# Patient Record
Sex: MALE | Race: WHITE
[De-identification: names, ages, dates, MRNs, and addresses within clinical notes are randomized per-mention and may not be internally consistent; named-entity substitution may affect disease eponyms.]

---

## 2018-01-03 ENCOUNTER — HOSPITAL ENCOUNTER (OUTPATIENT)
Dept: HOSPITAL 75 - CARD | Age: 23
End: 2018-01-03
Attending: INTERNAL MEDICINE
Payer: COMMERCIAL

## 2018-01-03 DIAGNOSIS — R07.9: Primary | ICD-10-CM

## 2018-01-03 PROCEDURE — 93306 TTE W/DOPPLER COMPLETE: CPT

## 2018-01-08 ENCOUNTER — HOSPITAL ENCOUNTER (OUTPATIENT)
Dept: HOSPITAL 75 - RAD | Age: 23
End: 2018-01-08
Attending: INTERNAL MEDICINE
Payer: COMMERCIAL

## 2018-01-08 DIAGNOSIS — R07.9: Primary | ICD-10-CM

## 2018-01-08 PROCEDURE — 71275 CT ANGIOGRAPHY CHEST: CPT

## 2018-01-08 NOTE — DIAGNOSTIC IMAGING REPORT
PROCEDURE: CT angiography of the chest with contrast.



TECHNIQUE: Multiple contiguous axial images were obtained through

the chest after uneventful bolus administration of intravenous

contrast. Reconstructed CTA MIP acquisitions were also performed.

 

INDICATION: Chest pain for three to four weeks.



FINDINGS:  There are no primary nodules, masses, or infiltrates.

There is no pleural or pericardial fluid. There is no

pneumothorax. There is no pathologically enlarged adenopathy in

the chest. The thoracic aorta is normal in caliber and without

evidence of dissection. There are no filling defects seen within

the pulmonary arteries to suggest a pulmonary embolism. The

visualized intraabdominal structures are unremarkable. The

osseous structures are unremarkable.



IMPRESSION: No acute abnormality in the chest. Specifically,

there is no evidence of a pulmonary embolism or aortic

dissection.



Dictated by: 



  Dictated on workstation # [id number]

## 2018-03-09 ENCOUNTER — HOSPITAL ENCOUNTER (EMERGENCY)
Dept: HOSPITAL 75 - ER | Age: 23
Discharge: HOME | End: 2018-03-09
Payer: COMMERCIAL

## 2018-03-09 VITALS — BODY MASS INDEX: 28.88 KG/M2 | WEIGHT: 225 LBS | HEIGHT: 74 IN

## 2018-03-09 VITALS — DIASTOLIC BLOOD PRESSURE: 74 MMHG | SYSTOLIC BLOOD PRESSURE: 114 MMHG

## 2018-03-09 DIAGNOSIS — R07.89: Primary | ICD-10-CM

## 2018-03-09 LAB
ALBUMIN SERPL-MCNC: 4.5 GM/DL (ref 3.2–4.5)
ALP SERPL-CCNC: 116 U/L (ref 40–136)
ALT SERPL-CCNC: 25 U/L (ref 0–55)
BASOPHILS # BLD AUTO: 0 10^3/UL (ref 0–0.1)
BASOPHILS NFR BLD AUTO: 0 % (ref 0–10)
BILIRUB SERPL-MCNC: 0.8 MG/DL (ref 0.1–1)
BUN/CREAT SERPL: 17
CALCIUM SERPL-MCNC: 9.2 MG/DL (ref 8.5–10.1)
CHLORIDE SERPL-SCNC: 104 MMOL/L (ref 98–107)
CO2 SERPL-SCNC: 24 MMOL/L (ref 21–32)
CREAT SERPL-MCNC: 1.09 MG/DL (ref 0.6–1.3)
EOSINOPHIL # BLD AUTO: 0 10^3/UL (ref 0–0.3)
EOSINOPHIL NFR BLD AUTO: 0 % (ref 0–10)
ERYTHROCYTE [DISTWIDTH] IN BLOOD BY AUTOMATED COUNT: 12.6 % (ref 10–14.5)
GFR SERPLBLD BASED ON 1.73 SQ M-ARVRAT: > 60 ML/MIN
GLUCOSE SERPL-MCNC: 96 MG/DL (ref 70–105)
HCT VFR BLD CALC: 43 % (ref 40–54)
HGB BLD-MCNC: 15.6 G/DL (ref 13.3–17.7)
LYMPHOCYTES # BLD AUTO: 2.2 X 10^3 (ref 1–4)
LYMPHOCYTES NFR BLD AUTO: 22 % (ref 12–44)
MAGNESIUM SERPL-MCNC: 2.1 MG/DL (ref 1.8–2.4)
MANUAL DIFFERENTIAL PERFORMED BLD QL: NO
MCH RBC QN AUTO: 31 PG (ref 25–34)
MCHC RBC AUTO-ENTMCNC: 37 G/DL (ref 32–36)
MCV RBC AUTO: 84 FL (ref 80–99)
MONOCYTES # BLD AUTO: 0.7 X 10^3 (ref 0–1)
MONOCYTES NFR BLD AUTO: 7 % (ref 0–12)
NEUTROPHILS # BLD AUTO: 7 X 10^3 (ref 1.8–7.8)
NEUTROPHILS NFR BLD AUTO: 71 % (ref 42–75)
PLATELET # BLD: 228 10^3/UL (ref 130–400)
PMV BLD AUTO: 9.6 FL (ref 7.4–10.4)
POTASSIUM SERPL-SCNC: 3.8 MMOL/L (ref 3.6–5)
PROT SERPL-MCNC: 7.1 GM/DL (ref 6.4–8.2)
RBC # BLD AUTO: 5.08 10^6/UL (ref 4.35–5.85)
SODIUM SERPL-SCNC: 138 MMOL/L (ref 135–145)
TSH SERPL DL<=0.05 MIU/L-ACNC: 2.16 UIU/ML (ref 0.35–4.94)
WBC # BLD AUTO: 10 10^3/UL (ref 4.3–11)

## 2018-03-09 PROCEDURE — 93005 ELECTROCARDIOGRAM TRACING: CPT

## 2018-03-09 PROCEDURE — 85025 COMPLETE CBC W/AUTO DIFF WBC: CPT

## 2018-03-09 PROCEDURE — 36415 COLL VENOUS BLD VENIPUNCTURE: CPT

## 2018-03-09 PROCEDURE — 84484 ASSAY OF TROPONIN QUANT: CPT

## 2018-03-09 PROCEDURE — 84443 ASSAY THYROID STIM HORMONE: CPT

## 2018-03-09 PROCEDURE — 80053 COMPREHEN METABOLIC PANEL: CPT

## 2018-03-09 PROCEDURE — 71045 X-RAY EXAM CHEST 1 VIEW: CPT

## 2018-03-09 PROCEDURE — 86141 C-REACTIVE PROTEIN HS: CPT

## 2018-03-09 PROCEDURE — 83735 ASSAY OF MAGNESIUM: CPT

## 2018-03-09 PROCEDURE — 93041 RHYTHM ECG TRACING: CPT

## 2018-03-09 NOTE — DIAGNOSTIC IMAGING REPORT
INDICATION: Chest pain starting an hour ago at baseball practice.



COMPARISON STUDY: CTA of the chest from January 8th.



FINDINGS: Frontal view of the chest demonstrates the lungs to be

clear. The heart, mediastinum, pulmonary vascularity and

visualized bony thorax are normal.



IMPRESSION: Negative chest. 



Dictated by: 



  Dictated on workstation # [id number]

## 2018-03-09 NOTE — ED CARDIAC GENERAL
History of Present Illness


General


Chief Complaint:  Chest Pain


Stated Complaint:  CHEST PAIN


Nursing Triage Note:  


PATIENT STATES THAT HE WAS PLAYING BASEBALL AND BEGAN EXPERIENCING CHEST PAIN 


WITH SOA AND DIZZINESS APPROX. 20MIN AGO.  HE EXPERIENCED THIS IN JANUARY ALSO 


AND WAS EVALUATED AND STATES THAT ALL TESTS WERE NEGATIVE.  PATIENT STATES THAT 


THE PAIN HAS DECREASED SINCE HE BEGAN TO REST OVER THE PAST 20MIN. HE ALSO 


STATES THAT HE HAS HAD A COLD WITH NAUSEA OVER THE PAST WEEK.





History of Present Illness


Date Seen by Provider:  Mar 9, 2018


Time Seen by Provider:  21:10


Initial Comments


22-year-old  male evaluated for left-sided chest pain. He began having 

these symptoms in 2018, he had a CT angiography of the chest and 

echocardiogram which were both normal. He reports that the chest pain resolved 

until tonight. He was playing a baseball game either third base or pitcherand 

began having the left-sided chest pain, no trauma to the chest or history of 

commotio cortis. He reports the pain was 8/10. After resting from the game, the 

pain was 3/10. He's had Tylenol earlier with no resolution of his symptoms. He 

reports respiratory congestion and nausea that began earlier this week, with a 

productive cough. He is taking Mucinex for the respiratory congestion. He 

denies pain with coughing or deep inspiration. Family history is essentially 

negative for cardiac disease. He denies any SOA, weakness or dysphagia. He 

denies any illicit drug or supplement use. He's had no change in his weight 

workouts. He has no radiation of pain or radicular symptoms into his left arm, 

left neck or face. He denies any increased stress or anxiety.


Timing/Duration:  1-3 hours


Severity:  mild (3/10)


Location:  central


Prior CP/Workup:  echocardiography, other (CT angiography)


Modifying Factors:  improves with rest


NTG SL PTA:  No


ASA po PTA:  No


Associated Systoms:  Chest Pain, Cough, No Diaphoresis, No Fever/Chills, No 

Headaches, No Loss of Appetite, No Malaise, No Nausea/Vomiting, No Rash, No 

Seizure, No Shortness of Air, No Syncope, No Weakness





Allergies and Home Medications


Allergies


Coded Allergies:  


     No Allergy Information Available (Unverified , 18)





Patient Home Medication List


Home Medication List Reviewed:  Yes





Review of Systems


Constitutional:  no symptoms reported, see HPI


Respiratory:  See HPI, Cough


Cardiovascular:  See HPI, Chest Pain





All Other Systems Reviewed


Negative Unless Noted:  Yes





Past Medical-Social-Family Hx


Patient Social History


Alcohol Use:  Occasionally Uses


Recreational Drug Use:  No


Smoking Status:  Never a Smoker


Recent Foreign Travel:  No


Contact w/Someone Who Travel:  No


Recent Infectious Disease Expo:  No


Recent Hopitalizations:  No





Seasonal Allergies


Seasonal Allergies:  No





Surgeries


History of Surgeries:  No





Respiratory


History of Respiratory Disorde:  No





Cardiovascular


History of Cardiac Disorders:  Yes (PATIENT EXPERIENCED CHEST PAIN IN )





Genitourinary


History of Genitourinary Disor:  No





Gastrointestinal


History of Gastrointestinal Di:  No





Musculoskeletal


History of Musculoskeletal Dis:  No





Endocrine


History of Endocrine Disorders:  No





HEENT


History of HEENT Disorders:  No





Cancer


History of Cancer:  No





Psychosocial


History of Psychiatric Problem:  No





Integumentary


History of Skin or Integumenta:  No





Blood Transfusions


History of Blood Disorders:  No





Physical Exam


Vital Signs





Vital Signs - First Documented








 3/9/18





 20:37


 


Temp 98.4


 


Pulse 89


 


Resp 20


 


B/P (MAP) 139/82 (101)


 


Pulse Ox 97


 


O2 Delivery Room Air





Capillary Refill : Less Than 3 Seconds


General Appearance:  No Apparent Distress


HEENT:  PERRL/EOMI, TMs Normal, Normal ENT Inspection, Pharynx Normal


Neck:  Full Range of Motion, Normal Inspection, Non Tender, Supple


Respiratory:  Chest Non Tender, Lungs Clear, Normal Breath Sounds


Cardiovascular:  Regular Rate, Rhythm, No Edema, No Murmur, Normal Peripheral 

Pulses, Other (no chest pain to palpation, no rib pain)


Gastrointestinal:  Normal Bowel Sounds, Non Tender, Soft


Extremity:  Normal Capillary Refill, Normal Inspection, Normal Range of Motion, 

Non Tender, No Pedal Edema


Neurologic/Psychiatric:  Alert, Oriented x3, No Motor/Sensory Deficits, Normal 

Mood/Affect


Skin:  Normal Color, Warm/Dry


Lymphatic:  No Adenopathy





Progress/Results/Core Measures


Results/Orders


Lab Results





Laboratory Tests








Test


  3/9/18


21:15 Range/Units


 


 


White Blood Count


  10.0 


  4.3-11.0


10^3/uL


 


Red Blood Count


  5.08 


  4.35-5.85


10^6/uL


 


Hemoglobin 15.6  13.3-17.7  G/DL


 


Hematocrit 43  40-54  %


 


Mean Corpuscular Volume 84  80-99  FL


 


Mean Corpuscular Hemoglobin 31  25-34  PG


 


Mean Corpuscular Hemoglobin


Concent 37 H


  32-36  G/DL


 


 


Red Cell Distribution Width 12.6  10.0-14.5  %


 


Platelet Count


  228 


  130-400


10^3/uL


 


Mean Platelet Volume 9.6  7.4-10.4  FL


 


Neutrophils (%) (Auto) 71  42-75  %


 


Lymphocytes (%) (Auto) 22  12-44  %


 


Monocytes (%) (Auto) 7  0-12  %


 


Eosinophils (%) (Auto) 0  0-10  %


 


Basophils (%) (Auto) 0  0-10  %


 


Neutrophils # (Auto) 7.0  1.8-7.8  X 10^3


 


Lymphocytes # (Auto) 2.2  1.0-4.0  X 10^3


 


Monocytes # (Auto) 0.7  0.0-1.0  X 10^3


 


Eosinophils # (Auto)


  0.0 


  0.0-0.3


10^3/uL


 


Basophils # (Auto)


  0.0 


  0.0-0.1


10^3/uL


 


Sodium Level 138  135-145  MMOL/L


 


Potassium Level 3.8  3.6-5.0  MMOL/L


 


Chloride Level 104    MMOL/L


 


Carbon Dioxide Level 24  21-32  MMOL/L


 


Anion Gap 10  5-14  MMOL/L


 


Blood Urea Nitrogen 18  7-18  MG/DL


 


Creatinine


  1.09 


  0.60-1.30


MG/DL


 


Estimat Glomerular Filtration


Rate > 60 


   


 


 


BUN/Creatinine Ratio 17   


 


Glucose Level 96    MG/DL


 


Calcium Level 9.2  8.5-10.1  MG/DL


 


Magnesium Level 2.1  1.8-2.4  MG/DL


 


Total Bilirubin 0.8  0.1-1.0  MG/DL


 


Aspartate Amino Transf


(AST/SGOT) 33 


  5-34  U/L


 


 


Alanine Aminotransferase


(ALT/SGPT) 25 


  0-55  U/L


 


 


Alkaline Phosphatase 116    U/L


 


Troponin I < 0.30  <0.30  NG/ML


 


C-Reactive Protein High


Sensitivity 0.21 


  0.00-0.50


MG/DL


 


Total Protein 7.1  6.4-8.2  GM/DL


 


Albumin 4.5  3.2-4.5  GM/DL


 


TSH Cascade Testing


  2.16 


  0.35-4.94


UIU/ML








My Orders





Orders - GAYATRI SALOMON ARNP


Troponin I (3/9/18 21:20)


Chest 1 View, Ap/Pa Only (3/9/18 21:20)


Saline Lock/Iv-Start (3/9/18 21:20)


Monitor-Rhythm Ecg Trace Only (3/9/18 21:20)


Cbc With Automated Diff (3/9/18 21:20)


Comprehensive Metabolic Panel (3/9/18 21:20)


Magnesium (3/9/18 21:20)


Thyroid Analyzer (3/9/18 21:20)


Ketorolac Injection (Toradol Injection) (3/9/18 21:21)


Ketorolac Injection (Toradol Injection) (3/9/18 21:21)


Hs C Reactive Protein (3/9/18 22:15)





Vital Signs/I&O





Vital Sign - Last 12Hours








 3/9/18 3/9/18





 20:37 22:46


 


Temp 98.4 98.0


 


Pulse 89 71


 


Resp 20 15


 


B/P (MAP) 139/82 (101) 114/74


 


Pulse Ox 97 100


 


O2 Delivery Room Air Room Air














Blood Pressure Mean:  101








Progress Note :  


   Time:  21:10


Progress Note


Initial evaluation completed, labs, chest x-ray and EKG to be completed. 

Toradol 30 mg IV for pain.


 patient reports complete resolution of his pain. He is drinking water with 

no nausea or vomiting. Labs are all essentially normal.


 discharge planning and return precautions reviewed with the patient. All 

questions answered.





ECG


Initial ECG Impression Date:  Mar 9, 2018


Initial ECG Impression Time:  20:52


Initial ECG Rate:  83


Initial ECG Rhythm:  Normal Sinus


Initial ECG Intervals:  Normal


Initial ECG Intervals


, QRSD 96, , QTc 442. Axis P 49, QRS 25, T 20.


Initial ECG Impression:  Normal


Comment


Reviewed EKG with Dr. Ferguson, concurred with interpretation.





Diagnostic Imaging





   Diagonstic Imaging:  Xray


   Plain Films/CT/US/NM/MRI:  chest


Comments





NAME:   EREN NARVAEZ


MED REC#:   [id number]


ACCOUNT#:   [id number]


PT STATUS:   REG ER


:   1995


PHYSICIAN:   GAYATRI SALOMON


ADMIT DATE:   18/ER


 ***Draft***


Date of Exam:18





CHEST 1 VIEW, AP/PA ONLY








INDICATION: Chest pain starting an hour ago at baseball practice.





COMPARISON STUDY: CTA of the chest from .





FINDINGS: Frontal view of the chest demonstrates the lungs to be


clear. The heart, mediastinum, pulmonary vascularity and


visualized bony thorax are normal.





IMPRESSION: Negative chest. 





  Dictated on workstation # [id number]








Dict:   18


Trans:   18


[facility] [id number]





Interpreted by:     SARITA ANDRES MD


Electronically signed by:


   Reviewed:  Reviewed by Me





Departure


Impression


Impression:  


 Primary Impression:  


 Chest pain


 Qualified Codes:  R07.89 - Other chest pain


Disposition:   HOME, SELF-CARE


Condition:  Improved





Departure-Patient Inst.


Referrals:  


[facility] (PCP/Family)


Primary Care Physician


Patient Instructions:  Chest Pain That Is Not Caused by the Heart (DC)





Add. Discharge Instructions:  


Activity as tolerated.


You may continue taking the Mucinex for upper respiratory congestion. 


Use ibuprofen 600 mg alternating with Tylenol 650 mg every 4 hours for pain.


Follow-up at [facility] early next week.


Return to emergency department for chest pain, shortness of breath, chest, calm 

a or new problems.





All discharge instructions reviewed with patient and/or family. Voiced 

understanding.





Copy


Copies To 1:   JOSE ORTIZ MD, AMY ARNP Mar 9, 2018 21:35